# Patient Record
Sex: FEMALE | Race: WHITE | NOT HISPANIC OR LATINO | Employment: UNEMPLOYED | URBAN - METROPOLITAN AREA
[De-identification: names, ages, dates, MRNs, and addresses within clinical notes are randomized per-mention and may not be internally consistent; named-entity substitution may affect disease eponyms.]

---

## 2019-06-12 ENCOUNTER — HISTORICAL (OUTPATIENT)
Dept: RADIOLOGY | Facility: HOSPITAL | Age: 54
End: 2019-06-12

## 2022-04-11 ENCOUNTER — HISTORICAL (OUTPATIENT)
Dept: ADMINISTRATIVE | Facility: HOSPITAL | Age: 57
End: 2022-04-11

## 2022-04-27 VITALS
BODY MASS INDEX: 21.7 KG/M2 | SYSTOLIC BLOOD PRESSURE: 110 MMHG | HEIGHT: 62 IN | DIASTOLIC BLOOD PRESSURE: 69 MMHG | WEIGHT: 117.94 LBS | OXYGEN SATURATION: 99 %

## 2022-11-25 ENCOUNTER — OFFICE VISIT (OUTPATIENT)
Dept: URGENT CARE | Facility: CLINIC | Age: 57
End: 2022-11-25

## 2022-11-25 VITALS
OXYGEN SATURATION: 98 % | BODY MASS INDEX: 21.68 KG/M2 | DIASTOLIC BLOOD PRESSURE: 84 MMHG | SYSTOLIC BLOOD PRESSURE: 144 MMHG | WEIGHT: 117.81 LBS | RESPIRATION RATE: 18 BRPM | HEIGHT: 62 IN | TEMPERATURE: 98 F | HEART RATE: 61 BPM

## 2022-11-25 DIAGNOSIS — R35.0 URINE FREQUENCY: ICD-10-CM

## 2022-11-25 DIAGNOSIS — N39.0 ACUTE UTI: Primary | ICD-10-CM

## 2022-11-25 LAB
BILIRUB UR QL STRIP: NEGATIVE
GLUCOSE UR QL STRIP: NEGATIVE
KETONES UR QL STRIP: NEGATIVE
LEUKOCYTE ESTERASE UR QL STRIP: NEGATIVE
PH, POC UA: 6.5
POC BLOOD, URINE: NEGATIVE
POC NITRATES, URINE: POSITIVE
PROT UR QL STRIP: NEGATIVE
SP GR UR STRIP: 1.02 (ref 1–1.03)
UROBILINOGEN UR STRIP-ACNC: 1 (ref 0.1–1.1)

## 2022-11-25 PROCEDURE — 81003 URINALYSIS AUTO W/O SCOPE: CPT | Mod: PBBFAC | Performed by: NURSE PRACTITIONER

## 2022-11-25 PROCEDURE — 99213 OFFICE O/P EST LOW 20 MIN: CPT | Mod: PBBFAC | Performed by: NURSE PRACTITIONER

## 2022-11-25 PROCEDURE — 87088 URINE BACTERIA CULTURE: CPT | Performed by: NURSE PRACTITIONER

## 2022-11-25 PROCEDURE — 99213 OFFICE O/P EST LOW 20 MIN: CPT | Mod: S$PBB,,, | Performed by: NURSE PRACTITIONER

## 2022-11-25 PROCEDURE — 99213 PR OFFICE/OUTPT VISIT, EST, LEVL III, 20-29 MIN: ICD-10-PCS | Mod: S$PBB,,, | Performed by: NURSE PRACTITIONER

## 2022-11-25 RX ORDER — CIPROFLOXACIN 500 MG/1
500 TABLET ORAL 2 TIMES DAILY
Qty: 14 TABLET | Refills: 0 | Status: SHIPPED | OUTPATIENT
Start: 2022-11-25 | End: 2022-12-02

## 2022-11-25 NOTE — PROGRESS NOTES
"Subjective:       Patient ID: Vannessa Noriega is a 57 y.o. female.    Vitals:  height is 5' 1.81" (1.57 m) and weight is 53.4 kg (117 lb 12.8 oz). Her oral temperature is 98.1 °F (36.7 °C). Her blood pressure is 144/84 (abnormal) and her pulse is 61. Her respiration is 18 and oxygen saturation is 98%.     Chief Complaint: Dysuria and Urinary Frequency    Patient is a 57-year-old female, here today for burning on urination, urinary frequency over the past few days.  Patient states she has had UTIs in the past, states she did well on Cipro in the past.      Cardiovascular: Negative.    Respiratory: Negative.     Gastrointestinal: Negative.    Genitourinary:  Positive for dysuria and frequency.     Objective:      Physical Exam   Constitutional: She is oriented to person, place, and time. She appears well-developed.   HENT:   Head: Normocephalic.   Eyes: Conjunctivae and EOM are normal. Pupils are equal, round, and reactive to light.   Neck: Neck supple.   Cardiovascular: Normal rate, regular rhythm and normal heart sounds.   Pulmonary/Chest: Effort normal and breath sounds normal.   Abdominal: Bowel sounds are normal. Soft. There is no abdominal tenderness. There is no rebound, no guarding, no left CVA tenderness and no right CVA tenderness.   Musculoskeletal: Normal range of motion.         General: Normal range of motion.   Neurological: She is alert and oriented to person, place, and time.   Skin: Skin is warm and dry.   Psychiatric: Her behavior is normal.   Vitals reviewed.      Assessment:       1. Acute UTI    2. Urine frequency              Office Visit on 11/25/2022   Component Date Value Ref Range Status    POC Blood, Urine 11/25/2022 Negative  Negative Final    POC Bilirubin, Urine 11/25/2022 Negative  Negative Final    POC Urobilinogen, Urine 11/25/2022 1.0  0.1 - 1.1 Final    POC Ketones, Urine 11/25/2022 Negative  Negative Final    POC Protein, Urine 11/25/2022 Negative  Negative Final    POC Nitrates, " Urine 11/25/2022 Positive (A)  Negative Final    POC Glucose, Urine 11/25/2022 Negative  Negative Final    pH, UA 11/25/2022 6.5   Final    POC Specific Gravity, Urine 11/25/2022 1.020  1.003 - 1.029 Final    POC Leukocytes, Urine 11/25/2022 Negative  Negative Final        No results found.   Plan:         Medication as prescribed.  Urine sent for C&S.  Maintain adequate hydration.  If any flank pain, fever or any symptoms immediately go to the ER.      Acute UTI  -     Urine culture  -     ciprofloxacin HCl (CIPRO) 500 MG tablet; Take 1 tablet (500 mg total) by mouth 2 (two) times daily. for 7 days  Dispense: 14 tablet; Refill: 0    Urine frequency  -     POCT Urinalysis, Dipstick, Automated, W/O Scope

## 2022-11-28 LAB — BACTERIA UR CULT: NO GROWTH
